# Patient Record
Sex: MALE | Race: WHITE | NOT HISPANIC OR LATINO | Employment: OTHER
[De-identification: names, ages, dates, MRNs, and addresses within clinical notes are randomized per-mention and may not be internally consistent; named-entity substitution may affect disease eponyms.]

---

## 2019-11-07 ENCOUNTER — HEALTH MAINTENANCE LETTER (OUTPATIENT)
Age: 69
End: 2019-11-07

## 2020-02-17 ENCOUNTER — HEALTH MAINTENANCE LETTER (OUTPATIENT)
Age: 70
End: 2020-02-17

## 2020-11-29 ENCOUNTER — HEALTH MAINTENANCE LETTER (OUTPATIENT)
Age: 70
End: 2020-11-29

## 2021-04-10 ENCOUNTER — HEALTH MAINTENANCE LETTER (OUTPATIENT)
Age: 71
End: 2021-04-10

## 2021-05-24 ENCOUNTER — RECORDS - HEALTHEAST (OUTPATIENT)
Dept: ADMINISTRATIVE | Facility: CLINIC | Age: 71
End: 2021-05-24

## 2021-05-25 ENCOUNTER — RECORDS - HEALTHEAST (OUTPATIENT)
Dept: ADMINISTRATIVE | Facility: CLINIC | Age: 71
End: 2021-05-25

## 2021-09-25 ENCOUNTER — HEALTH MAINTENANCE LETTER (OUTPATIENT)
Age: 71
End: 2021-09-25

## 2022-05-01 ENCOUNTER — HEALTH MAINTENANCE LETTER (OUTPATIENT)
Age: 72
End: 2022-05-01

## 2022-07-11 ENCOUNTER — APPOINTMENT (OUTPATIENT)
Dept: ULTRASOUND IMAGING | Facility: CLINIC | Age: 72
End: 2022-07-11
Attending: EMERGENCY MEDICINE
Payer: MEDICARE

## 2022-07-11 ENCOUNTER — HOSPITAL ENCOUNTER (EMERGENCY)
Facility: CLINIC | Age: 72
Discharge: HOME | End: 2022-07-11
Attending: EMERGENCY MEDICINE | Admitting: EMERGENCY MEDICINE
Payer: MEDICARE

## 2022-07-11 VITALS
BODY MASS INDEX: 22.48 KG/M2 | DIASTOLIC BLOOD PRESSURE: 86 MMHG | SYSTOLIC BLOOD PRESSURE: 151 MMHG | RESPIRATION RATE: 20 BRPM | OXYGEN SATURATION: 99 % | HEART RATE: 51 BPM | HEIGHT: 70 IN | TEMPERATURE: 97.9 F | WEIGHT: 157 LBS

## 2022-07-11 DIAGNOSIS — R10.13 EPIGASTRIC PAIN: ICD-10-CM

## 2022-07-11 LAB
ALBUMIN SERPL-MCNC: 3.9 G/DL (ref 3.4–5)
ALP SERPL-CCNC: 111 U/L (ref 40–150)
ALT SERPL W P-5'-P-CCNC: 24 U/L (ref 0–70)
ANION GAP SERPL CALCULATED.3IONS-SCNC: 4 MMOL/L (ref 3–14)
AST SERPL W P-5'-P-CCNC: 12 U/L (ref 0–45)
BASOPHILS # BLD AUTO: 0 10E3/UL (ref 0–0.2)
BASOPHILS NFR BLD AUTO: 1 %
BILIRUB SERPL-MCNC: 0.7 MG/DL (ref 0.2–1.3)
BUN SERPL-MCNC: 16 MG/DL (ref 7–30)
CALCIUM SERPL-MCNC: 8.7 MG/DL (ref 8.5–10.1)
CHLORIDE BLD-SCNC: 113 MMOL/L (ref 94–109)
CO2 SERPL-SCNC: 23 MMOL/L (ref 20–32)
CREAT SERPL-MCNC: 1.04 MG/DL (ref 0.66–1.25)
EOSINOPHIL # BLD AUTO: 0.1 10E3/UL (ref 0–0.7)
EOSINOPHIL NFR BLD AUTO: 2 %
ERYTHROCYTE [DISTWIDTH] IN BLOOD BY AUTOMATED COUNT: 13.3 % (ref 10–15)
GFR SERPL CREATININE-BSD FRML MDRD: 77 ML/MIN/1.73M2
GLUCOSE BLD-MCNC: 82 MG/DL (ref 70–99)
HCT VFR BLD AUTO: 48.7 % (ref 40–53)
HGB BLD-MCNC: 15.3 G/DL (ref 13.3–17.7)
IMM GRANULOCYTES # BLD: 0 10E3/UL
IMM GRANULOCYTES NFR BLD: 0 %
LIPASE SERPL-CCNC: 114 U/L (ref 73–393)
LYMPHOCYTES # BLD AUTO: 1.1 10E3/UL (ref 0.8–5.3)
LYMPHOCYTES NFR BLD AUTO: 18 %
MCH RBC QN AUTO: 28.9 PG (ref 26.5–33)
MCHC RBC AUTO-ENTMCNC: 31.4 G/DL (ref 31.5–36.5)
MCV RBC AUTO: 92 FL (ref 78–100)
MONOCYTES # BLD AUTO: 0.5 10E3/UL (ref 0–1.3)
MONOCYTES NFR BLD AUTO: 9 %
NEUTROPHILS # BLD AUTO: 4.3 10E3/UL (ref 1.6–8.3)
NEUTROPHILS NFR BLD AUTO: 70 %
NRBC # BLD AUTO: 0 10E3/UL
NRBC BLD AUTO-RTO: 0 /100
PLATELET # BLD AUTO: 76 10E3/UL (ref 150–450)
POTASSIUM BLD-SCNC: 4 MMOL/L (ref 3.4–5.3)
PROT SERPL-MCNC: 7.3 G/DL (ref 6.8–8.8)
RBC # BLD AUTO: 5.3 10E6/UL (ref 4.4–5.9)
SODIUM SERPL-SCNC: 140 MMOL/L (ref 133–144)
TROPONIN I SERPL HS-MCNC: 7 NG/L
WBC # BLD AUTO: 6 10E3/UL (ref 4–11)

## 2022-07-11 PROCEDURE — 96361 HYDRATE IV INFUSION ADD-ON: CPT

## 2022-07-11 PROCEDURE — 258N000003 HC RX IP 258 OP 636: Performed by: EMERGENCY MEDICINE

## 2022-07-11 PROCEDURE — 96360 HYDRATION IV INFUSION INIT: CPT

## 2022-07-11 PROCEDURE — 84484 ASSAY OF TROPONIN QUANT: CPT | Performed by: EMERGENCY MEDICINE

## 2022-07-11 PROCEDURE — 76705 ECHO EXAM OF ABDOMEN: CPT

## 2022-07-11 PROCEDURE — 85025 COMPLETE CBC W/AUTO DIFF WBC: CPT | Performed by: EMERGENCY MEDICINE

## 2022-07-11 PROCEDURE — 99285 EMERGENCY DEPT VISIT HI MDM: CPT | Mod: 25

## 2022-07-11 PROCEDURE — 93005 ELECTROCARDIOGRAM TRACING: CPT

## 2022-07-11 PROCEDURE — 36415 COLL VENOUS BLD VENIPUNCTURE: CPT | Performed by: EMERGENCY MEDICINE

## 2022-07-11 PROCEDURE — 83690 ASSAY OF LIPASE: CPT | Performed by: EMERGENCY MEDICINE

## 2022-07-11 PROCEDURE — 80053 COMPREHEN METABOLIC PANEL: CPT | Performed by: EMERGENCY MEDICINE

## 2022-07-11 PROCEDURE — 250N000013 HC RX MED GY IP 250 OP 250 PS 637: Performed by: EMERGENCY MEDICINE

## 2022-07-11 RX ORDER — FAMOTIDINE 20 MG/1
20 TABLET, FILM COATED ORAL ONCE
Status: COMPLETED | OUTPATIENT
Start: 2022-07-11 | End: 2022-07-11

## 2022-07-11 RX ORDER — SUCRALFATE 1 G/1
1 TABLET ORAL ONCE
Status: COMPLETED | OUTPATIENT
Start: 2022-07-11 | End: 2022-07-11

## 2022-07-11 RX ORDER — SUCRALFATE 1 G/1
1 TABLET ORAL 4 TIMES DAILY
Qty: 30 TABLET | Refills: 1 | Status: SHIPPED | OUTPATIENT
Start: 2022-07-11

## 2022-07-11 RX ORDER — SODIUM CHLORIDE 9 MG/ML
INJECTION, SOLUTION INTRAVENOUS CONTINUOUS
Status: DISCONTINUED | OUTPATIENT
Start: 2022-07-11 | End: 2022-07-11 | Stop reason: HOSPADM

## 2022-07-11 RX ADMIN — SUCRALFATE 1 G: 1 TABLET ORAL at 12:05

## 2022-07-11 RX ADMIN — FAMOTIDINE 20 MG: 20 TABLET ORAL at 12:05

## 2022-07-11 RX ADMIN — SODIUM CHLORIDE 1000 ML: 9 INJECTION, SOLUTION INTRAVENOUS at 12:05

## 2022-07-11 ASSESSMENT — ENCOUNTER SYMPTOMS
BLOOD IN STOOL: 0
SHORTNESS OF BREATH: 0
ABDOMINAL PAIN: 1
CONSTIPATION: 1
BACK PAIN: 1
CHILLS: 0
VOMITING: 0
DYSURIA: 0
FREQUENCY: 0
NAUSEA: 1
FEVER: 0

## 2022-07-11 NOTE — ED PROVIDER NOTES
History   Chief Complaint:  Abdominal Pain    The history is provided by the patient.      Casa Pierre is a 71 year old male with history of ruiz's esophagus, pancreatitis, GERD, and hyperlipidemia who presents with abdominal pain. States that he has been experiencing abdominal pain that radiates to his back for the past 3-4 days. The pain is exacerbated with movement such as bending over or picking something up and if it is severe enough, he will become nauseous. Notes that he is having fewer bowel movements because he is eating less. His last bowel movement was two days ago and it was not black or bloody. Adds that drinking water can help relieve pain sometimes. States that he recently started taking acetazolamide and pantoprazole. Of note, patient reports that the pain is similar to pain he had in 2007 when he was diagnosed with pancreatitis. States that the cause of his pancreatitis is unknown. Denies history of alcohol use, gallstones, cigarette use, stomach ulcer, gastritis, or abdominal surgery. Denies vomiting, fever, chills, urinary symptoms, chest pain, shortness of breath, and skin rashes.    Review of Systems   Constitutional: Negative for chills and fever.   Respiratory: Negative for shortness of breath.    Cardiovascular: Negative for chest pain.   Gastrointestinal: Positive for abdominal pain, constipation and nausea. Negative for blood in stool and vomiting.   Genitourinary: Negative for dysuria and frequency.   Musculoskeletal: Positive for back pain.   Skin: Negative for rash.   All other systems reviewed and are negative.    Allergies:  No Known Allergies    Medications:  Omeprazole  Acetazolamide  Pantoprazole   Aspirin 81 MG  Atorvastatin  Trazodone    Past Medical History:     Ruiz's esophagus   Pancreatitis  Tobacco use  Migraine  Hyperlipidemia  GERD  Cervical disc disease  Lipoma of skin and subcutaneous tissue  Radiculopathy of cervical spine     Past Surgical History:   "  Fusion cervical spine  Broken jaw orthopedic surgery     Family History:    Brother- thyroid disorder  Sister- thyroid disorder    Social History:  Presents with wife, Maria T  Presents via private vehicle    Physical Exam     Patient Vitals for the past 24 hrs:   BP Temp Temp src Pulse Resp SpO2 Height Weight   07/11/22 1230 (!) 151/86 -- -- -- -- 99 % -- --   07/11/22 1057 (!) 148/86 97.9  F (36.6  C) Temporal 51 20 99 % 1.778 m (5' 10\") 71.2 kg (157 lb)     Physical Exam  General: Elderly male sitting upright  Eyes: PERRL, Conjunctive within normal limits.  No scleral icterus  ENT: Moist mucous membranes, oropharynx clear.   CV: Normal S1S2, no murmur, rub or gallop. Bradycardic, regular.  Resp: Clear to auscultation bilaterally, no wheezes, rales or rhonchi. Normal respiratory effort.  GI: Abdomen is soft nondistended.  Epigastric tenderness palpation with mild right upper quadrant tenderness.  No palpable masses. No rebound or guarding.  MSK: No edema. Nontender. Normal active range of motion.  Skin: Warm and dry. No rashes or lesions or ecchymoses on visible skin.  Neuro: Alert and oriented. Responds appropriately to all questions and commands. No focal findings appreciated. Normal muscle tone.  Psych: Normal mood and affect.     Emergency Department Course   ECG  ECG taken at 1229, ECG read at 1230  Sinus bradycardia   Rate 48 bpm. OR interval 182 ms. QRS duration 96 ms. QT/QTc 442/394 ms. P-R-T axes 16 -15 -2.     Imaging:  Abdomen US, limited (RUQ only)   Final Result   IMPRESSION:   1.  Normal liver and gallbladder. No biliary ductal dilatation.      HUMZA SAMPSON MD            SYSTEM ID:  [id number]        Report per radiology    Laboratory:  Labs Ordered and Resulted from Time of ED Arrival to Time of ED Departure   COMPREHENSIVE METABOLIC PANEL - Abnormal       Result Value    Sodium 140      Potassium 4.0      Chloride 113 (*)     Carbon Dioxide (CO2) 23      Anion Gap 4      Urea Nitrogen 16      " Creatinine 1.04      Calcium 8.7      Glucose 82      Alkaline Phosphatase 111      AST 12      ALT 24      Protein Total 7.3      Albumin 3.9      Bilirubin Total 0.7      GFR Estimate 77     CBC WITH PLATELETS AND DIFFERENTIAL - Abnormal    WBC Count 6.0      RBC Count 5.30      Hemoglobin 15.3      Hematocrit 48.7      MCV 92      MCH 28.9      MCHC 31.4 (*)     RDW 13.3      Platelet Count 76 (*)     % Neutrophils 70      % Lymphocytes 18      % Monocytes 9      % Eosinophils 2      % Basophils 1      % Immature Granulocytes 0      NRBCs per 100 WBC 0      Absolute Neutrophils 4.3      Absolute Lymphocytes 1.1      Absolute Monocytes 0.5      Absolute Eosinophils 0.1      Absolute Basophils 0.0      Absolute Immature Granulocytes 0.0      Absolute NRBCs 0.0     LIPASE - Normal    Lipase 114     TROPONIN I - Normal    Troponin I High Sensitivity 7       Emergency Department Course:     Reviewed:  I reviewed nursing notes, vitals, past medical history and Care Everywhere    Assessments:  1142 I obtained history and examined the patient as noted above.    1332 I rechecked and updated the patient. Patient denies any significant abdominal pain.  There is no tenderness on palpation of his abdomen.    Interventions:  1205 0.9% NS Bolus, 1000 mL, IV  1205 Famotidine, 20 mg, PO  1205 Sucralfate, 1 g, PO    Disposition:  The patient was discharged to home.     Impression & Plan   Medical Decision Making:   Casa Pierre is a 71 year old male who presents with epigastric abdominal pain.  He has localized pain to the epigastric area.  A broad differential diagnosis was of course considered.  As he has a reported history of pancreatitis, this was considered however his lipase was normal.  Ultrasound did not show evidence of gallbladder disease or other pathology in the right upper abdomen.  He had improvement with GI medications including Carafate and Pepcid.  Haptic ulcer disease or gastritis was considered.  Bowel  obstruction, appendicitis, diverticulitis, perforation, or other acute life threatening pathology or surgical pathology seem much less likely.  He has no fever or leukocytosis.  He is not anemic.  At this point he is well-appearing.  I will not therefore admit him for serial exams and further workup.  Patient is hemodynamically stable in ED. Plan is home with abdominal pain recheck by primary care physician within 2 to 3 days or return to ED at anytime.  Return for fever, increasing pain, vomiting of blood or black or bloody stool, other new symptoms develop.  Abdominal pain handout given.  Questions were answered.  He felt comfortable this plan.  He will continue omeprazole, follow-up with his PCP with consideration of outpatient endoscopy.    Diagnosis:    ICD-10-CM    1. Epigastric pain  R10.13        Discharge Medications:  New Prescriptions    SUCRALFATE (CARAFATE) 1 GM TABLET    Take 1 tablet (1 g) by mouth 4 times daily     Scribe Disclosure:  Brittany DIMAS, am serving as a scribe at 11:42 AM on 7/11/2022 to document services personally performed by Maria T Pompa MD based on my observations and the provider's statements to me.      Maria T Pompa MD  07/11/22 1640

## 2022-07-11 NOTE — ED TRIAGE NOTES
"Pt c/o diffuse abdominal pain, has a hx of pancreatis, pt states this feels the same. Denies nausea, no diarrhea. Seen at [facility] and referred to ED because \"their machine is broken\".      Triage Assessment     Row Name 07/11/22 1055       Triage Assessment (Adult)    Airway WDL WDL       Respiratory WDL    Respiratory WDL WDL       Skin Circulation/Temperature WDL    Skin Circulation/Temperature WDL WDL       Cardiac WDL    Cardiac WDL WDL       Peripheral/Neurovascular WDL    Peripheral Neurovascular WDL WDL       Cognitive/Neuro/Behavioral WDL    Cognitive/Neuro/Behavioral WDL WDL              "

## 2022-07-12 LAB
ATRIAL RATE - MUSE: 48 BPM
DIASTOLIC BLOOD PRESSURE - MUSE: NORMAL MMHG
INTERPRETATION ECG - MUSE: NORMAL
P AXIS - MUSE: 16 DEGREES
PR INTERVAL - MUSE: 182 MS
QRS DURATION - MUSE: 96 MS
QT - MUSE: 442 MS
QTC - MUSE: 394 MS
R AXIS - MUSE: -15 DEGREES
SYSTOLIC BLOOD PRESSURE - MUSE: NORMAL MMHG
T AXIS - MUSE: -2 DEGREES
VENTRICULAR RATE- MUSE: 48 BPM

## 2022-08-19 ENCOUNTER — HOSPITAL ENCOUNTER (OUTPATIENT)
Dept: ULTRASOUND IMAGING | Facility: CLINIC | Age: 72
Discharge: HOME | End: 2022-08-19
Attending: INTERNAL MEDICINE | Admitting: INTERNAL MEDICINE
Payer: MEDICARE

## 2022-08-19 DIAGNOSIS — E04.1 THYROID NODULE: ICD-10-CM

## 2022-08-19 PROCEDURE — 88173 CYTOPATH EVAL FNA REPORT: CPT | Mod: TC | Performed by: INTERNAL MEDICINE

## 2022-08-19 PROCEDURE — 10005 FNA BX W/US GDN 1ST LES: CPT

## 2022-08-19 PROCEDURE — 250N000009 HC RX 250: Performed by: RADIOLOGY

## 2022-08-19 RX ORDER — LIDOCAINE HYDROCHLORIDE 10 MG/ML
10 INJECTION, SOLUTION EPIDURAL; INFILTRATION; INTRACAUDAL; PERINEURAL ONCE
Status: COMPLETED | OUTPATIENT
Start: 2022-08-19 | End: 2022-08-19

## 2022-08-19 RX ADMIN — LIDOCAINE HYDROCHLORIDE 10 ML: 10 INJECTION, SOLUTION EPIDURAL; INFILTRATION; INTRACAUDAL; PERINEURAL at 14:14

## 2022-08-19 NOTE — PROGRESS NOTES
Left thyroid nodule biopsy:  Slides prepared dry, slides in alcohol and aspirate in Afrima. Sent to lab. Reviewed discharge instructions with pt.

## 2022-08-23 LAB
PATH REPORT.COMMENTS IMP SPEC: NORMAL
PATH REPORT.COMMENTS IMP SPEC: NORMAL
PATH REPORT.FINAL DX SPEC: NORMAL
PATH REPORT.GROSS SPEC: NORMAL
PATH REPORT.MICROSCOPIC SPEC OTHER STN: NORMAL
PATH REPORT.RELEVANT HX SPEC: NORMAL

## 2022-08-23 PROCEDURE — 88173 CYTOPATH EVAL FNA REPORT: CPT | Mod: 26

## 2022-12-26 ENCOUNTER — HEALTH MAINTENANCE LETTER (OUTPATIENT)
Age: 72
End: 2022-12-26

## 2023-03-03 ENCOUNTER — HOSPITAL ENCOUNTER (EMERGENCY)
Facility: CLINIC | Age: 73
Discharge: HOME | End: 2023-03-03
Attending: EMERGENCY MEDICINE | Admitting: EMERGENCY MEDICINE
Payer: MEDICARE

## 2023-03-03 ENCOUNTER — APPOINTMENT (OUTPATIENT)
Dept: CT IMAGING | Facility: CLINIC | Age: 73
End: 2023-03-03
Attending: EMERGENCY MEDICINE
Payer: MEDICARE

## 2023-03-03 ENCOUNTER — APPOINTMENT (OUTPATIENT)
Dept: MRI IMAGING | Facility: CLINIC | Age: 73
End: 2023-03-03
Attending: EMERGENCY MEDICINE
Payer: MEDICARE

## 2023-03-03 VITALS
OXYGEN SATURATION: 98 % | TEMPERATURE: 98 F | DIASTOLIC BLOOD PRESSURE: 81 MMHG | RESPIRATION RATE: 20 BRPM | HEART RATE: 46 BPM | SYSTOLIC BLOOD PRESSURE: 150 MMHG

## 2023-03-03 DIAGNOSIS — R29.818 DIFFICULTY BALANCING: ICD-10-CM

## 2023-03-03 DIAGNOSIS — H53.8 BLURRED VISION: ICD-10-CM

## 2023-03-03 DIAGNOSIS — M62.81 GENERALIZED MUSCLE WEAKNESS: ICD-10-CM

## 2023-03-03 LAB
ANION GAP SERPL CALCULATED.3IONS-SCNC: 8 MMOL/L (ref 7–15)
BASOPHILS # BLD AUTO: 0 10E3/UL (ref 0–0.2)
BASOPHILS NFR BLD AUTO: 1 %
BUN SERPL-MCNC: 19.5 MG/DL (ref 8–23)
CALCIUM SERPL-MCNC: 9.1 MG/DL (ref 8.8–10.2)
CHLORIDE SERPL-SCNC: 107 MMOL/L (ref 98–107)
CREAT SERPL-MCNC: 1.21 MG/DL (ref 0.67–1.17)
DEPRECATED HCO3 PLAS-SCNC: 25 MMOL/L (ref 22–29)
EOSINOPHIL # BLD AUTO: 0.1 10E3/UL (ref 0–0.7)
EOSINOPHIL NFR BLD AUTO: 2 %
ERYTHROCYTE [DISTWIDTH] IN BLOOD BY AUTOMATED COUNT: 13.3 % (ref 10–15)
GFR SERPL CREATININE-BSD FRML MDRD: 64 ML/MIN/1.73M2
GLUCOSE SERPL-MCNC: 93 MG/DL (ref 70–99)
HCT VFR BLD AUTO: 49.1 % (ref 40–53)
HGB BLD-MCNC: 15.6 G/DL (ref 13.3–17.7)
HOLD SPECIMEN: NORMAL
IMM GRANULOCYTES # BLD: 0 10E3/UL
IMM GRANULOCYTES NFR BLD: 0 %
LYMPHOCYTES # BLD AUTO: 1.5 10E3/UL (ref 0.8–5.3)
LYMPHOCYTES NFR BLD AUTO: 27 %
MCH RBC QN AUTO: 29.3 PG (ref 26.5–33)
MCHC RBC AUTO-ENTMCNC: 31.8 G/DL (ref 31.5–36.5)
MCV RBC AUTO: 92 FL (ref 78–100)
MONOCYTES # BLD AUTO: 0.5 10E3/UL (ref 0–1.3)
MONOCYTES NFR BLD AUTO: 9 %
NEUTROPHILS # BLD AUTO: 3.4 10E3/UL (ref 1.6–8.3)
NEUTROPHILS NFR BLD AUTO: 61 %
NRBC # BLD AUTO: 0 10E3/UL
NRBC BLD AUTO-RTO: 0 /100
PLATELET # BLD AUTO: 66 10E3/UL (ref 150–450)
POTASSIUM SERPL-SCNC: 4.4 MMOL/L (ref 3.4–5.3)
RBC # BLD AUTO: 5.32 10E6/UL (ref 4.4–5.9)
SODIUM SERPL-SCNC: 140 MMOL/L (ref 136–145)
WBC # BLD AUTO: 5.6 10E3/UL (ref 4–11)

## 2023-03-03 PROCEDURE — 70496 CT ANGIOGRAPHY HEAD: CPT | Mod: MG

## 2023-03-03 PROCEDURE — A9585 GADOBUTROL INJECTION: HCPCS | Performed by: EMERGENCY MEDICINE

## 2023-03-03 PROCEDURE — 99285 EMERGENCY DEPT VISIT HI MDM: CPT | Mod: 25

## 2023-03-03 PROCEDURE — 80048 BASIC METABOLIC PNL TOTAL CA: CPT | Performed by: EMERGENCY MEDICINE

## 2023-03-03 PROCEDURE — 99207 PR NO BILLABLE SERVICE THIS VISIT: CPT | Performed by: NURSE PRACTITIONER

## 2023-03-03 PROCEDURE — 0042T CT HEAD PERFUSION W CONTRAST: CPT

## 2023-03-03 PROCEDURE — 250N000011 HC RX IP 250 OP 636: Performed by: EMERGENCY MEDICINE

## 2023-03-03 PROCEDURE — 250N000009 HC RX 250: Performed by: EMERGENCY MEDICINE

## 2023-03-03 PROCEDURE — 85025 COMPLETE CBC W/AUTO DIFF WBC: CPT | Performed by: EMERGENCY MEDICINE

## 2023-03-03 PROCEDURE — 70498 CT ANGIOGRAPHY NECK: CPT | Mod: MG

## 2023-03-03 PROCEDURE — G1010 CDSM STANSON: HCPCS

## 2023-03-03 PROCEDURE — 255N000002 HC RX 255 OP 636: Performed by: EMERGENCY MEDICINE

## 2023-03-03 PROCEDURE — 36415 COLL VENOUS BLD VENIPUNCTURE: CPT | Performed by: EMERGENCY MEDICINE

## 2023-03-03 PROCEDURE — 70553 MRI BRAIN STEM W/O & W/DYE: CPT | Mod: MA

## 2023-03-03 RX ORDER — GADOBUTROL 604.72 MG/ML
7 INJECTION INTRAVENOUS ONCE
Status: COMPLETED | OUTPATIENT
Start: 2023-03-03 | End: 2023-03-03

## 2023-03-03 RX ORDER — IOPAMIDOL 755 MG/ML
500 INJECTION, SOLUTION INTRAVASCULAR ONCE
Status: COMPLETED | OUTPATIENT
Start: 2023-03-03 | End: 2023-03-03

## 2023-03-03 RX ADMIN — GADOBUTROL 7 ML: 604.72 INJECTION INTRAVENOUS at 15:12

## 2023-03-03 RX ADMIN — IOPAMIDOL 125 ML: 755 INJECTION, SOLUTION INTRAVENOUS at 13:55

## 2023-03-03 RX ADMIN — SODIUM CHLORIDE 80 ML: 9 INJECTION, SOLUTION INTRAVENOUS at 13:55

## 2023-03-03 ASSESSMENT — ACTIVITIES OF DAILY LIVING (ADL)
ADLS_ACUITY_SCORE: 35
ADLS_ACUITY_SCORE: 35

## 2023-03-03 NOTE — ED NOTES
Neuro Cognitive (Adult) Cognitive/Neuro/Behavioral WDL: all  (pt comes in with dizziness, blurry vision and altered sense of awareness since this am. pt A&Ox4)  Level of Consciousness: alert  Arousal Level: opens eyes spontaneously  Orientation: oriented x 4  Speech: clear; spontaneous  Mood/Behavior: cooperative; calm   [place] Coma Scale Best Eye Response: 4-->(E4) spontaneous  Best Motor Response: 6-->(M6) obeys commands  Best Verbal Response: 5-->(V5) oriented  Perfecto Coma Scale Score: 15   Hand /Ankle Strength Hand , Left: strong  Hand , Right: strong  Dorsiflexion, Left: strong  Dorsiflexion, Right: strong  Plantarflexion, Left: strong  Plantarflexion, Right: strong   Motor Strength Left Upper Motor Strength: 5 - active movement against gravity and full resistance  Right Upper Motor Strength: 5 - active movement against gravity and full resistance  Left Lower Motor Strength: 5 - active movement against gravity and full resistance  Right Lower Motor Strength: 5 - active movement against gravity and full resistance

## 2023-03-03 NOTE — ED TRIAGE NOTES
"Dizziness, vision changes. \"I felt like I was walking and I wasn't connected to the floor\". This started last night around 2100. Decreased energy and impaired gait. Woke this morning and it was the same or worse. Denies 1 side feeling weaker than the other. Taking acetazolamide for optic nerve and elevated CSF levels.       "

## 2023-03-03 NOTE — ED PROVIDER NOTES
History     Chief Complaint:  Neurologic Problem (/)       HPI   Casa Pierre is a 72 year old male who presents with balance difficulty, generalized weakness, and blurred vision.  Onset was around 2100 yesterday evening.  Patient denies any lateralizing weakness or numbness.  He does state that he has had worsened coordination with his right hand for the last several weeks separate from the symptoms described above.  He is right-hand dominant.  States that he is currently being treated for an optic nerve issue, unsure of the specific diagnosis.      Independent Historian:   None - Patient Only    Review of External Notes: none     ROS:  Review of Systems   ROS: ROS neg other than the symptoms noted above in the HPI.    Allergies:  No Known Allergies     Medications:    aspirin 81 MG tablet  Coenzyme Q10 (COQ-10 PO)  PANTOPRAZOLE SODIUM PO  sucralfate (CARAFATE) 1 GM tablet    Past Medical History:    Past Medical History:   Diagnosis Date     Allergic state      GERD (gastroesophageal reflux disease)      Hyperlipidemia      Other chronic pain      Pancreatic disease 2016       Past Surgical History:    Past Surgical History:   Procedure Laterality Date     FUSION CERVICAL ANTERIOR, POSTERIOR THREE+ LEVELS, COMBINED N/A 8/20/2014    Procedure: COMBINED FUSION CERVICAL ANTERIOR, POSTERIOR THREE+ LEVELS;  Surgeon: Ventura Kennedy MD;  Location:  OR      UGI ENDOSCOPY W EUS N/A 9/8/2016    Procedure: COMBINED ENDOSCOPIC ULTRASOUND, ESOPHAGOSCOPY, GASTROSCOPY, DUODENOSCOPY (EGD);  Surgeon: Montserrat Mcmahon MD;  Location:  OR     ORTHOPEDIC SURGERY      broken jaw        Social History:   reports that he quit smoking about 8 years ago. His smoking use included cigarettes. He has a 7.50 pack-year smoking history. He has never used smokeless tobacco. He reports current alcohol use. He reports that he does not use drugs.  PCP: [facility], [facility] (Inactive)     Physical Exam      Patient Vitals for the past 24 hrs:   BP Temp Pulse Resp SpO2   03/03/23 1713 -- -- -- -- 98 %   03/03/23 1701 (!) 150/81 -- (!) 46 -- 100 %   03/03/23 1646 (!) 149/83 -- -- -- 100 %   03/03/23 1625 (!) 161/90 -- -- -- 100 %   03/03/23 1610 -- -- -- -- 93 %   03/03/23 1555 (!) 148/83 -- -- -- (!) 87 %   03/03/23 1547 (!) 139/98 -- (!) 49 -- --   03/03/23 1455 -- -- (!) 49 -- 98 %   03/03/23 1440 (!) 158/104 -- 50 -- 100 %   03/03/23 1336 (!) 174/92 98  F (36.7  C) 53 20 99 %        Physical Exam  General: Laying on the ED bed, no distress  HEENT: Normocephalic, atraumatic  Cardiac: Warm and well perfused, regular bradycardia  Pulm: Breathing comfortably, no accessory muscle usage, no conversational dyspnea, and lungs clear bilaterally  GI: Abdomen soft, nontender, no rigidity or guarding  MSK: No deformities  Skin: Warm and dry  Neuro: Moves all extremities  Psych: Normal mood and affect     National [employer] Stroke Scale  Exam Interval: Baseline   Score    Level of consciousness: (0)   Alert, keenly responsive    LOC questions: (0)   Answers both questions correctly    LOC commands: (0)   Performs both tasks correctly    Best gaze: (0)   Normal    Visual: (0)   No visual loss    Facial palsy: (0)   Normal symmetrical movements    Motor arm (left): (0)   No drift    Motor arm (right): (0)   No drift    Motor leg (left): (0)   No drift    Motor leg (right): (0)   No drift    Limb ataxia: (1)   Present in one limb    Sensory: (0)   Normal- no sensory loss    Best language: (0)   Normal- no aphasia    Dysarthria: (0)   Normal    Extinction and inattention: (0)   No abnormality        Total Score:  1        Emergency Department Course   [unfilled]    Imaging:  MR Brain w/o & w Contrast   Final Result   IMPRESSION:     1. No evidence of acute infarct, mass, hemorrhage, or herniation.   2. Mild nonspecific white matter changes likely due to chronic   microvascular ischemic disease.       ZARIA SHAH MD             SYSTEM ID:  [id number]      CT Head Perfusion w Contrast   Final Result   IMPRESSION:    1. Patent arteries in the neck without evidence of dissection. Mild   atherosclerotic disease in the carotid arteries bilaterally without   significant stenosis by NASCET criteria.   2. Patent proximal major intracranial arteries without vascular   cutoff. No significant stenosis. No aneurysm identified.   3. Unremarkable CT perfusion images of the head.      Results discussed with Jeremi Gant at 2:12 PM on 3/3/2023.       ZARIA SHAH MD            SYSTEM ID:  [id number]      CTA Head Neck w Contrast   Final Result   IMPRESSION:    1. Patent arteries in the neck without evidence of dissection. Mild   atherosclerotic disease in the carotid arteries bilaterally without   significant stenosis by NASCET criteria.   2. Patent proximal major intracranial arteries without vascular   cutoff. No significant stenosis. No aneurysm identified.   3. Unremarkable CT perfusion images of the head.      Results discussed with Jeremi Gant at 2:12 PM on 3/3/2023.       ZARIA SHAH MD            SYSTEM ID:  [id number]      CT Head w/o Contrast   Final Result   IMPRESSION: No evidence of acute intracranial hemorrhage, mass, or   herniation.         ZARIA SHAH MD            SYSTEM ID:  [id number]         Report per radiology    Laboratory:  Labs Ordered and Resulted from Time of ED Arrival to Time of ED Departure   BASIC METABOLIC PANEL - Abnormal       Result Value    Sodium 140      Potassium 4.4      Chloride 107      Carbon Dioxide (CO2) 25      Anion Gap 8      Urea Nitrogen 19.5      Creatinine 1.21 (*)     Calcium 9.1      Glucose 93      GFR Estimate 64     CBC WITH PLATELETS AND DIFFERENTIAL - Abnormal    WBC Count 5.6      RBC Count 5.32      Hemoglobin 15.6      Hematocrit 49.1      MCV 92      MCH 29.3      MCHC 31.8      RDW 13.3      Platelet Count 66 (*)     % Neutrophils 61      % Lymphocytes 27      % Monocytes 9      %  Eosinophils 2      % Basophils 1      % Immature Granulocytes 0      NRBCs per 100 WBC 0      Absolute Neutrophils 3.4      Absolute Lymphocytes 1.5      Absolute Monocytes 0.5      Absolute Eosinophils 0.1      Absolute Basophils 0.0      Absolute Immature Granulocytes 0.0      Absolute NRBCs 0.0          Procedures     Emergency Department Course & Assessments:    Interventions:  Medications   CT Scan Flush (80 mLs Intravenous $Given 3/3/23 1355)   iopamidol (ISOVUE-370) solution 500 mL (125 mLs Intravenous $Given 3/3/23 1355)   gadobutrol (GADAVIST) injection 7 mL (7 mLs Intravenous $Given 3/3/23 1512)        Consultations/Discussion of Management or Tests:  1418 I spoke with Dr. Forrest of radiology regarding patient's presentation, findings, and plan of care.      1419 I spoke with More Alejandre CNP of stroke neurology regarding patient's presentation, findings, and plan of care.     1730 I spoke with Dr. Ojeda of stroke neurology regarding patient's presentation, findings, and plan of care.      Social Determinants of Health affecting care:   None    Assessments:  ED Course as of 03/03/23 1820   Fri Mar 03, 2023   1342 I obtained history and examined the patient as noted above.   1741 I returned to check on patient. We discussed findings and plan of care.        Disposition:  The patient was discharged to home.     Impression & Plan    CMS Diagnoses: The patient has stroke symptoms:         ED Stroke specific documentation           NIHSS PDF     Patient last known well time: 2100 yesterday  ED Provider first to bedside at: 1330  CT Results received at: as above    Thrombolytics:   Not given due to:   - minor/isolated/quickly resolving symptoms    If treating with thrombolytics: Ensure SBP<180 and DBP<105 prior to treatment with thrombolytics.  Administering thrombolytics after treatment with IV labetalol, hydralazine, or nicardipine is reasonable once BP control is established.    Endovascular  Retrieval:  Not initiated due to absence of proximal vessel occlusion  Stroke Mimics were considered (including migraine headache, seizure disorder, hypoglycemia (or hyperglycemia), head or spinal trauma, CNS infection, Toxin ingestion and shock state (e.g. sepsis) .     Medical Decision Makin-year-old male presents with balance difficulty, blurred vision, general unwell feeling as described above.  He does have lateralizing subtle decreased coordination in his right upper extremity on point-to-point's that seems to have been going on for several weeks and is not a new symptom with the others that started around 2100 last night.  Tier 2 code stroke was called and CT/CTA imaging showed no acute findings.  MRI was also thankfully negative for signs of parenchymal ischemia of the brain.  Patient ambulated to the bathroom independently multiple times without significant difficulty in the ED.  He states that his vision is also returned to baseline.  Differential at this time still includes mild dehydration as the patient did state that his visual symptoms seemed somewhat postural.  It is also possible that he had an episode of vertigo that resolved spontaneously.  Plan is for discharge home with general neurology and PCP follow-up for further care.    Critical Care time:  was 0 minutes for this patient excluding procedures.    Diagnosis:    ICD-10-CM    1. Difficulty balancing  R29.818 Adult Neurology  Referral      2. Blurred vision  H53.8 Adult Neurology  Referral      3. Generalized muscle weakness  M62.81 Adult Neurology  Referral             Scribe Disclosure:  I, Brooklyn Lopez, am serving as a scribe at 5:46 PM on 3/3/2023 to document services personally performed by Jeremi Gant MD based on my observations and the provider's statements to me.     3/3/2023   Jeremi Gant MD King, Colin, MD  [phone]

## 2023-03-03 NOTE — CONSULTS
"  [facility]    Stroke Telephone Note    I was called by Dr. Gant on 03/03/23 regarding patient Casa Pierre. The patient is a 72 year old male with past medical history significant for HLD, GERD, migraine. He presented to the ED for evaluation of impaired balance and blurred vision that began 3/2 at 2100. Not on anticoagulation per chart review. Per chart, he also has been experiencing diminished coordination of his right arm for a few weeks.    Stroke Code Data (for stroke code without tele)  Stroke code activated 03/03/23   1343   Stroke provider first response  03/03/23   1345            Last known normal 03/02/23   2045        Time of discovery   (or onset of symptoms) 03/02/23   2100   Head CT read by Stroke Neuro Dr/Provider 03/03/23   1400   Was stroke code de-escalated? Yes 03/03/23 1419          Imaging Findings   CTH negative for acute pathology  CTA head/neck without significant stenosis or LVO.     Intravenous Thrombolysis  Not given due to:   - unclear or unfavorable risk-benefit profile for extended window thrombolysis beyond the conventional 4.5 hour time window    Endovascular Treatment  Not initiated due to absence of proximal vessel occlusion    Impression  Imbalance and vision changes concerning for posterior circulation stroke. MRI brain pending.      Recommendations   - MRI brain w/wo    My recommendations are based on the information provided over the phone by Casa Pierre's in-person providers. They are not intended to replace the clinical judgment of his in-person providers. I was not requested to personally see or examine the patient at this time.    More Alejandre, CNP  Vascular Neurology    To page me or covering stroke neurology team member, click here: [email or web address]  Choose \"On Call\" tab at top, then select \"NEUROLOGY/ALL SITES\" from middle drop-down box, press Enter, then look for \"stroke\" or \"telestroke\" for your site.        "

## 2023-04-16 ENCOUNTER — HEALTH MAINTENANCE LETTER (OUTPATIENT)
Age: 73
End: 2023-04-16

## 2024-06-23 ENCOUNTER — HEALTH MAINTENANCE LETTER (OUTPATIENT)
Age: 74
End: 2024-06-23

## 2025-01-23 ENCOUNTER — HOSPITAL ENCOUNTER (EMERGENCY)
Facility: CLINIC | Age: 75
Discharge: HOME | End: 2025-01-23
Attending: EMERGENCY MEDICINE
Payer: MEDICARE

## 2025-01-23 ENCOUNTER — APPOINTMENT (OUTPATIENT)
Dept: CT IMAGING | Facility: CLINIC | Age: 75
End: 2025-01-23
Attending: EMERGENCY MEDICINE
Payer: MEDICARE

## 2025-01-23 VITALS
RESPIRATION RATE: 16 BRPM | WEIGHT: 156 LBS | SYSTOLIC BLOOD PRESSURE: 143 MMHG | TEMPERATURE: 98 F | DIASTOLIC BLOOD PRESSURE: 72 MMHG | HEIGHT: 70 IN | HEART RATE: 84 BPM | OXYGEN SATURATION: 96 % | BODY MASS INDEX: 22.33 KG/M2

## 2025-01-23 DIAGNOSIS — N13.30 HYDRONEPHROSIS OF LEFT KIDNEY: ICD-10-CM

## 2025-01-23 DIAGNOSIS — R10.9 LEFT FLANK PAIN: Primary | ICD-10-CM

## 2025-01-23 LAB
ALBUMIN SERPL BCG-MCNC: 4.4 G/DL (ref 3.5–5.2)
ALBUMIN UR-MCNC: NEGATIVE MG/DL
ALP SERPL-CCNC: 115 U/L (ref 40–150)
ALT SERPL W P-5'-P-CCNC: 20 U/L (ref 0–70)
ANION GAP SERPL CALCULATED.3IONS-SCNC: 14 MMOL/L (ref 7–15)
APPEARANCE UR: CLEAR
AST SERPL W P-5'-P-CCNC: 19 U/L (ref 0–45)
BASOPHILS # BLD AUTO: 0.1 10E3/UL (ref 0–0.2)
BASOPHILS NFR BLD AUTO: 0 %
BILIRUB DIRECT SERPL-MCNC: <0.2 MG/DL (ref 0–0.3)
BILIRUB SERPL-MCNC: 0.3 MG/DL
BILIRUB UR QL STRIP: NEGATIVE
BUN SERPL-MCNC: 22.7 MG/DL (ref 8–23)
CALCIUM SERPL-MCNC: 9.3 MG/DL (ref 8.8–10.4)
CHLORIDE SERPL-SCNC: 106 MMOL/L (ref 98–107)
COLOR UR AUTO: ABNORMAL
CREAT SERPL-MCNC: 1.48 MG/DL (ref 0.67–1.17)
EGFRCR SERPLBLD CKD-EPI 2021: 49 ML/MIN/1.73M2
EOSINOPHIL # BLD AUTO: 0 10E3/UL (ref 0–0.7)
EOSINOPHIL NFR BLD AUTO: 0 %
ERYTHROCYTE [DISTWIDTH] IN BLOOD BY AUTOMATED COUNT: 13.4 % (ref 10–15)
GLUCOSE SERPL-MCNC: 93 MG/DL (ref 70–99)
GLUCOSE UR STRIP-MCNC: NEGATIVE MG/DL
HCO3 SERPL-SCNC: 18 MMOL/L (ref 22–29)
HCT VFR BLD AUTO: 46.4 % (ref 40–53)
HGB BLD-MCNC: 14.7 G/DL (ref 13.3–17.7)
HGB UR QL STRIP: NEGATIVE
HOLD SPECIMEN: NORMAL
HOLD SPECIMEN: NORMAL
IMM GRANULOCYTES # BLD: 0.1 10E3/UL
IMM GRANULOCYTES NFR BLD: 0 %
KETONES UR STRIP-MCNC: 10 MG/DL
LEUKOCYTE ESTERASE UR QL STRIP: NEGATIVE
LIPASE SERPL-CCNC: 35 U/L (ref 13–60)
LYMPHOCYTES # BLD AUTO: 0.8 10E3/UL (ref 0.8–5.3)
LYMPHOCYTES NFR BLD AUTO: 6 %
MCH RBC QN AUTO: 28.8 PG (ref 26.5–33)
MCHC RBC AUTO-ENTMCNC: 31.7 G/DL (ref 31.5–36.5)
MCV RBC AUTO: 91 FL (ref 78–100)
MONOCYTES # BLD AUTO: 0.7 10E3/UL (ref 0–1.3)
MONOCYTES NFR BLD AUTO: 5 %
NEUTROPHILS # BLD AUTO: 12.3 10E3/UL (ref 1.6–8.3)
NEUTROPHILS NFR BLD AUTO: 89 %
NITRATE UR QL: NEGATIVE
NRBC # BLD AUTO: 0 10E3/UL
NRBC BLD AUTO-RTO: 0 /100
PH UR STRIP: 7 [PH] (ref 5–7)
PLAT MORPH BLD: NORMAL
PLATELET # BLD AUTO: 75 10E3/UL (ref 150–450)
POTASSIUM SERPL-SCNC: 4.5 MMOL/L (ref 3.4–5.3)
PROT SERPL-MCNC: 7.3 G/DL (ref 6.4–8.3)
RBC # BLD AUTO: 5.11 10E6/UL (ref 4.4–5.9)
RBC MORPH BLD: NORMAL
RBC URINE: 1 /HPF
SODIUM SERPL-SCNC: 138 MMOL/L (ref 135–145)
SP GR UR STRIP: 1.02 (ref 1–1.03)
SQUAMOUS EPITHELIAL: <1 /HPF
UROBILINOGEN UR STRIP-MCNC: NORMAL MG/DL
WBC # BLD AUTO: 13.9 10E3/UL (ref 4–11)
WBC URINE: 1 /HPF

## 2025-01-23 PROCEDURE — 250N000009 HC RX 250: Performed by: EMERGENCY MEDICINE

## 2025-01-23 PROCEDURE — 85004 AUTOMATED DIFF WBC COUNT: CPT | Performed by: EMERGENCY MEDICINE

## 2025-01-23 PROCEDURE — 74177 CT ABD & PELVIS W/CONTRAST: CPT

## 2025-01-23 PROCEDURE — 81001 URINALYSIS AUTO W/SCOPE: CPT | Performed by: EMERGENCY MEDICINE

## 2025-01-23 PROCEDURE — 96374 THER/PROPH/DIAG INJ IV PUSH: CPT

## 2025-01-23 PROCEDURE — 250N000011 HC RX IP 250 OP 636: Performed by: EMERGENCY MEDICINE

## 2025-01-23 PROCEDURE — 99285 EMERGENCY DEPT VISIT HI MDM: CPT | Mod: 25

## 2025-01-23 PROCEDURE — 85041 AUTOMATED RBC COUNT: CPT | Performed by: EMERGENCY MEDICINE

## 2025-01-23 PROCEDURE — 82248 BILIRUBIN DIRECT: CPT | Performed by: EMERGENCY MEDICINE

## 2025-01-23 PROCEDURE — 82310 ASSAY OF CALCIUM: CPT | Performed by: EMERGENCY MEDICINE

## 2025-01-23 PROCEDURE — 83690 ASSAY OF LIPASE: CPT | Performed by: EMERGENCY MEDICINE

## 2025-01-23 PROCEDURE — 85014 HEMATOCRIT: CPT | Performed by: EMERGENCY MEDICINE

## 2025-01-23 PROCEDURE — 250N000013 HC RX MED GY IP 250 OP 250 PS 637: Performed by: EMERGENCY MEDICINE

## 2025-01-23 PROCEDURE — 36415 COLL VENOUS BLD VENIPUNCTURE: CPT | Performed by: EMERGENCY MEDICINE

## 2025-01-23 RX ORDER — ONDANSETRON 2 MG/ML
4 INJECTION INTRAMUSCULAR; INTRAVENOUS ONCE
Status: COMPLETED | OUTPATIENT
Start: 2025-01-23 | End: 2025-01-23

## 2025-01-23 RX ORDER — OXYCODONE HYDROCHLORIDE 5 MG/1
5 TABLET ORAL ONCE
Status: COMPLETED | OUTPATIENT
Start: 2025-01-23 | End: 2025-01-23

## 2025-01-23 RX ORDER — POLYETHYLENE GLYCOL 3350 17 G/17G
1 POWDER, FOR SOLUTION ORAL DAILY
Qty: 510 G | Refills: 0 | Status: SHIPPED | OUTPATIENT
Start: 2025-01-23 | End: 2025-01-23

## 2025-01-23 RX ORDER — OXYCODONE HYDROCHLORIDE 5 MG/1
5 TABLET ORAL EVERY 6 HOURS PRN
Qty: 10 TABLET | Refills: 0 | Status: SHIPPED | OUTPATIENT
Start: 2025-01-23 | End: 2025-01-23

## 2025-01-23 RX ORDER — POLYETHYLENE GLYCOL 3350 17 G/17G
1 POWDER, FOR SOLUTION ORAL DAILY
Qty: 510 G | Refills: 0 | Status: SHIPPED | OUTPATIENT
Start: 2025-01-23

## 2025-01-23 RX ORDER — OXYCODONE HYDROCHLORIDE 5 MG/1
5 TABLET ORAL EVERY 6 HOURS PRN
Qty: 10 TABLET | Refills: 0 | Status: SHIPPED | OUTPATIENT
Start: 2025-01-23

## 2025-01-23 RX ORDER — IOPAMIDOL 755 MG/ML
500 INJECTION, SOLUTION INTRAVASCULAR ONCE
Status: COMPLETED | OUTPATIENT
Start: 2025-01-23 | End: 2025-01-23

## 2025-01-23 RX ADMIN — SODIUM CHLORIDE 60 ML: 9 INJECTION, SOLUTION INTRAVENOUS at 17:34

## 2025-01-23 RX ADMIN — IOPAMIDOL 79 ML: 755 INJECTION, SOLUTION INTRAVENOUS at 17:33

## 2025-01-23 RX ADMIN — OXYCODONE HYDROCHLORIDE 5 MG: 5 TABLET ORAL at 16:41

## 2025-01-23 RX ADMIN — ONDANSETRON 4 MG: 2 INJECTION INTRAMUSCULAR; INTRAVENOUS at 16:41

## 2025-01-23 ASSESSMENT — COLUMBIA-SUICIDE SEVERITY RATING SCALE - C-SSRS
1. IN THE PAST MONTH, HAVE YOU WISHED YOU WERE DEAD OR WISHED YOU COULD GO TO SLEEP AND NOT WAKE UP?: NO
2. HAVE YOU ACTUALLY HAD ANY THOUGHTS OF KILLING YOURSELF IN THE PAST MONTH?: NO
6. HAVE YOU EVER DONE ANYTHING, STARTED TO DO ANYTHING, OR PREPARED TO DO ANYTHING TO END YOUR LIFE?: NO

## 2025-01-23 ASSESSMENT — ACTIVITIES OF DAILY LIVING (ADL)
ADLS_ACUITY_SCORE: 41

## 2025-01-23 NOTE — ED TRIAGE NOTES
Pt presents for evaluation of LLQ abdominal pain that started around 1000, associated nausea. Hx of pancreatitis. No known hx of stones or diverticulosis. Pain rated 9/10. Denies bowel or bladder issues.

## 2025-01-23 NOTE — ED PROVIDER NOTES
"  Emergency Department Note      History of Present Illness     Chief Complaint   Abdominal Pain      HPI   [place] is a 74 year old male with a history of pancreatitis, GERD, and migraine who presents with his wife to the Emergency Department for left flank pain. The patient reports he has had constant left-sided flank pain since 1000 that is worsened with palpation. He has had similar presentations of this pain in the past when he was diagnosed with pancreatitis, however the current pain is sharp and worse than previous occurences. The pain radiates to his LUQ. He endorses associated nausea. Denies vomiting, chest pain, shortness of breath, rhinorrhea, cough, problems with urination, melena, or diarrhea. He also denies history of nephrolithiasis or aortic disease.    Independent Historian   None    Review of External Notes   I reviewed the patient's 8/26/24 office visit note.    Past Medical History     Medical History and Problem List   Dahl's esophagus   GERD  Hyperlipidemia   Lipoma  Migraine   Pancreatitis  Radiculopathy of cervical spine   Tobacco use     Medications   Aspirin 81 MG   Pantoprazole  Sucralfate     Surgical History   Fusion of cervical anterior, posterior 3+ levels combines  Broken jaw repair    Physical Exam     Patient Vitals for the past 24 hrs:   BP Temp Temp src Pulse Resp SpO2 Height Weight   01/23/25 1955 (!) 143/72 -- -- 84 16 96 % -- --   01/23/25 1523 (!) 164/85 98  F (36.7  C) Oral 74 20 97 % 1.778 m (5' 10\") 70.8 kg (156 lb)     Physical Exam  Constitutional:       General: Not in acute distress.        Appearance: Normal appearance.   HENT:      Head: Normocephalic and atraumatic.   Eyes:      Extraocular Movements: Extraocular movements intact.      Conjunctiva/sclera: Conjunctivae normal.   Cardiovascular:      Rate and Rhythm: Normal rate and regular rhythm.   Pulmonary:      Effort: Pulmonary effort is normal. No respiratory distress.      Breath sounds: Normal " breath sounds.   Abdominal:      General: Abdomen is flat. There is no distension.      Palpations: Abdomen is soft.      Tenderness: There is left upper flank and left upper abdominal tenderness to palpation.   Musculoskeletal:      Cervical back: Normal range of motion. No rigidity.      Right lower leg: No edema.      Left lower leg: No edema.   Skin:     General: Skin is warm and dry.   Neurological:      General: No focal deficit present.      Mental Status: Alert and oriented to person, place, and time.   Psychiatric:         Mood and Affect: Mood normal.         Behavior: Behavior normal.    Diagnostics     Lab Results   Labs Ordered and Resulted from Time of ED Arrival to Time of ED Departure   COMPREHENSIVE METABOLIC PANEL - Abnormal       Result Value    Sodium 138      Potassium 4.5      Carbon Dioxide (CO2) 18 (*)     Anion Gap 14      Urea Nitrogen 22.7      Creatinine 1.48 (*)     GFR Estimate 49 (*)     Calcium 9.3      Chloride 106      Glucose 93      Alkaline Phosphatase 115      AST 19      ALT 20      Protein Total 7.3      Albumin 4.4      Bilirubin Total 0.3     ROUTINE UA WITH MICROSCOPIC REFLEX TO CULTURE - Abnormal    Color Urine Light Yellow      Appearance Urine Clear      Glucose Urine Negative      Bilirubin Urine Negative      Ketones Urine 10 (*)     Specific Gravity Urine 1.016      Blood Urine Negative      pH Urine 7.0      Protein Albumin Urine Negative      Urobilinogen Urine Normal      Nitrite Urine Negative      Leukocyte Esterase Urine Negative      RBC Urine 1      WBC Urine 1      Squamous Epithelials Urine <1     CBC WITH PLATELETS AND DIFFERENTIAL - Abnormal    WBC Count 13.9 (*)     RBC Count 5.11      Hemoglobin 14.7      Hematocrit 46.4      MCV 91      MCH 28.8      MCHC 31.7      RDW 13.4      Platelet Count 75 (*)     % Neutrophils 89      % Lymphocytes 6      % Monocytes 5      % Eosinophils 0      % Basophils 0      % Immature Granulocytes 0      NRBCs per 100 WBC  0      Absolute Neutrophils 12.3 (*)     Absolute Lymphocytes 0.8      Absolute Monocytes 0.7      Absolute Eosinophils 0.0      Absolute Basophils 0.1      Absolute Immature Granulocytes 0.1      Absolute NRBCs 0.0     LIPASE - Normal    Lipase 35     BILIRUBIN DIRECT - Normal    Bilirubin Direct <0.20     RBC AND PLATELET MORPHOLOGY    RBC Morphology Confirmed RBC Indices      Platelet Assessment        Value: Automated Count Confirmed. Platelet morphology is normal.       Imaging   CT Abdomen Pelvis w Contrast   Final Result   IMPRESSION:    1.  Mild left hydronephrosis with significant perinephric fat stranding, suggestive of obstruction but no nephroureterolithiasis is visualized, could be seen in setting of recently passed stone. Could also consider urology referral and CT urogram if    symptoms persist. Recommend correlation with urinalysis to exclude superimposed urinary tract infection/pyelonephritis.         EKG   None    Independent Interpretation   None    ED Course      Medications Administered   Medications   oxyCODONE (ROXICODONE) tablet 5 mg (5 mg Oral $Given 1/23/25 1641)   ondansetron (ZOFRAN) injection 4 mg (4 mg Intravenous $Given 1/23/25 1641)   iopamidol (ISOVUE-370) solution 500 mL (79 mLs Intravenous $Given 1/23/25 1733)   CT Scan Flush (60 mLs Intravenous $Given 1/23/25 1734)       Procedures   None     Discussion of Management   None    ED Course   ED Course as of 01/23/25 2319   Thu Jan 23, 2025   1629 Creatinine(!): 1.48  Near baseline   1632 I evaluated the patient, obtained history, and performed a physical exam as detailed above.    1825 Discussed patient with urologist Dr. Saini who recommends outpatient follow-up if pain well controlled.   1920 I rechecked the patient and updated them on the plan of care. The patient reports his pain has subsided.    1925 I rechecked the patient and updated them on the plan of care.        Additional Documentation  None    Medical Decision Making /  "Diagnosis     CMS Diagnoses: None    MIPS       None    [facility]   74-year-old male as described above presents to the emergency department for left flank and left upper quadrant abdominal pain.  Patient hemodynamically stable at time of evaluation.  Afebrile.  Differential diagnosis considered includes, but not limited to, pyelonephritis, ureteral stone, infectious colitis, ischemic colitis, diverticulitis, bowel obstruction, and abdominal aortic aneurysm/dissection.  Abdominal pain lab work ordered.  Will obtain CT abdomen pelvis with contrast for evaluation of above discussed differentials.  Discussed care plan with patient who voiced understanding and agreement with plan.  Answered all questions.  Additional workup and orders as listed in chart.     Ultimately, work up shows \"Mild left hydronephrosis with significant perinephric fat stranding, suggestive of obstruction but no nephroureterolithiasis is visualized, could be seen in setting of recently passed stone. Urine analysis unremarkable and no significant MELO. Patient's pain completely with singular oxycodone PO. Patient feels comfortable with discharge to outpatient follow-up. Symptoms today certainly could be secondary to recently passed stone vs ureteral stricture/anatomical obstruction. Nonetheless, after discussion with urology, given patient's pain is well controlled and no obvious worsening of renal function, no role for emergent stenting and patient may follow-up outpatient. I will make patient an outpatient urology referral. Discussed return precautions. Answered all questions. Patient and wife voiced understanding and agreement with plan and comfortable with discharge home.     Please refer to ED course above as part of continuation of MDM for details on the patient's treatment course and any potential changes or updates beyond my initial evaluation and MDM creation.    Disposition   The patient was discharged.     Diagnosis     ICD-10-CM    1. Left " flank pain  R10.9 Adult Urology  Referral      2. Hydronephrosis of left kidney  N13.30 Adult Urology  Referral           Discharge Medications   Discharge Medication List as of 1/23/2025  7:42 PM        Scribe Disclosure:  I, Carito Franco, am serving as a scribe at 4:27 PM on 1/23/2025 to document services personally performed by Al Satnos DO based on my observations and the provider's statements to me.        Al Santos DO  01/23/25 1296

## 2025-01-24 NOTE — DISCHARGE INSTRUCTIONS
Please follow-up with your primary care provider and/or specialist regarding your visit to the ER today.    Please return to the emergency department should you experience any of the symptoms we specifically discussed, including but not limited to recurrence or worsening of your symptoms, or development of any new and concerning symptoms such as fever, worsening abdominal pain, inability to urinate, or blood in urine.    Please follow-up with urology for reevaluation of this potential blockage in your left kidney.  Also follow-up with your primary care doctor for repeat kidney function testing.    Please take medication as instructed on bottle.    While taking oxycodone, please make sure to take a stool softener to prevent constipation and bowel obstruction.

## 2025-01-24 NOTE — ED NOTES
Discharged instructions discussed at length with patient and wife. Paper maps with directions to [facility] provided.